# Patient Record
Sex: MALE | Race: WHITE | NOT HISPANIC OR LATINO | Employment: STUDENT | URBAN - METROPOLITAN AREA
[De-identification: names, ages, dates, MRNs, and addresses within clinical notes are randomized per-mention and may not be internally consistent; named-entity substitution may affect disease eponyms.]

---
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
## 2022-05-03 ENCOUNTER — SOCIAL WORK (OUTPATIENT)
Dept: BEHAVIORAL/MENTAL HEALTH CLINIC | Facility: CLINIC | Age: 22
End: 2022-05-03
Payer: COMMERCIAL

## 2022-05-03 DIAGNOSIS — F41.9 ANXIETY: Primary | ICD-10-CM

## 2022-05-03 PROCEDURE — 90834 PSYTX W PT 45 MINUTES: CPT | Performed by: SOCIAL WORKER

## 2022-05-04 NOTE — PSYCH
Psychotherapy Provided: Individual Psychotherapy 45 minutes     Length of time in session: 45 minutes, follow up in 2 weeks  Goals addressed in session: Goal 1     Pt presents to this writer with recent onset of anxiety  Pt reports the problem started to manifest about 18 months ago, and came out of nowhere  Pt reports he is currently a student at [employer]  Pt reports his course is going well  He is studying management, and supply chain  Pt reports when he is not at school he lives with his parents and younger sister  Pt reports his family are high functioning, professional, and very supportive  Pt reports he and his sister have always been pushed by their parents  Pt states he also pushes himself and wants to excel in school and in his personal life  Pt reports he has no major trauma hx  Pt enjoyed highschool and academically pt was not all that challenged  Pt reports his Melvi Harder has been a step up, and pt reports he found the transition a little difficult to handle at first  Pt reports he plays sports  Pt has a girlfriend  Pt has a good friendship group  Pt reports he feels pretty good about himself  Pt noticed the anxiety elevated around the latter stages of Covid  Pt reports he thinks this was because all the norms of life went out of the window  Pt also states his sister also started to experience elevated anxiety around the same time  Pt reports he feels this indicates he and his sister have similar constitutions and there was something about the external stressors that led to them both feeling an increase in anxiety  Pt reports he tends to get spikes of anxiety  Pt reports most of the spikes in anxiety are at times where he feels added pressure, or when he is entering a situation where he has less control, or the situation is unfamiliar, with a certain expectation placed upon him   Pt reports he has experienced anxiety at certain family reunions, going to dance recitals with his girlfriend  Pt reports the occasions are quite sparse, but when he has an anxiety outbreak he starts to get worried about the anxiety and he is concerned it will snow ball  Pt states when he gets the elevated anxiety, he is already working on grounding techniques and self soothing techniques  Pt reports he has some success with these techniques, but they are not sufficient enough to stop a fully developed anxiety attack from discontinuing  This writer and pt discussed strategies to increase pt's chances of success  Pt is encouraged to try and limit caffeine, improve his exercise, utilize structure and routine to make his life more predictable  Pt is encouraged to always make decisions based on having a little more control  For example, taking his car and not his girlfriends  Pt is encouraged to address some of the general anxiety with an increase in self care, and an increase in structure and routine in his life  Pt and this writer also discussed as needed medicine  Pt reports for the amount of times he gets an anxiety attack, it might be useful to have a medicine like Atarax for those situations which pt knows ahead of time will be challenging  This writer and pt even discussed how having PRN medication on hand could be away to help his mind calm down, and feel less worried about an anxiety outbreak, which can be a positive self fulfilling prophecy instead of a negative one  Pt is encouraged to follow up with this writer as needed  Pain:      none    0    Current suicide risk : Low     Pt is not suicidal and future oriented  Behavioral Health Treatment Plan [facility]: Diagnosis and Treatment Plan explained to Maude Briones relates understanding diagnosis and is agreeable to Treatment Plan   Yes

## 2022-05-10 ENCOUNTER — OFFICE VISIT (OUTPATIENT)
Dept: FAMILY MEDICINE CLINIC | Facility: OTHER | Age: 22
End: 2022-05-10
Payer: COMMERCIAL

## 2022-05-10 ENCOUNTER — TELEPHONE (OUTPATIENT)
Dept: FAMILY MEDICINE CLINIC | Facility: OTHER | Age: 22
End: 2022-05-10

## 2022-05-10 VITALS
TEMPERATURE: 97.8 F | DIASTOLIC BLOOD PRESSURE: 86 MMHG | WEIGHT: 134.2 LBS | OXYGEN SATURATION: 98 % | HEART RATE: 93 BPM | BODY MASS INDEX: 17.79 KG/M2 | SYSTOLIC BLOOD PRESSURE: 134 MMHG | HEIGHT: 73 IN | RESPIRATION RATE: 18 BRPM

## 2022-05-10 DIAGNOSIS — Z11.59 NEED FOR HEPATITIS C SCREENING TEST: ICD-10-CM

## 2022-05-10 DIAGNOSIS — F40.10 SOCIAL ANXIETY DISORDER: ICD-10-CM

## 2022-05-10 DIAGNOSIS — Z13.1 SCREENING FOR DIABETES MELLITUS: ICD-10-CM

## 2022-05-10 DIAGNOSIS — Z00.00 ANNUAL PHYSICAL EXAM: Primary | ICD-10-CM

## 2022-05-10 DIAGNOSIS — Z13.6 SCREENING FOR CARDIOVASCULAR CONDITION: ICD-10-CM

## 2022-05-10 DIAGNOSIS — Z11.4 SCREENING FOR HIV (HUMAN IMMUNODEFICIENCY VIRUS): ICD-10-CM

## 2022-05-10 PROBLEM — J30.1 SEASONAL ALLERGIC RHINITIS DUE TO POLLEN: Status: ACTIVE | Noted: 2017-08-16

## 2022-05-10 PROBLEM — J45.990 EXERCISE-INDUCED BRONCHOSPASM: Status: ACTIVE | Noted: 2018-08-27

## 2022-05-10 PROCEDURE — 1036F TOBACCO NON-USER: CPT | Performed by: FAMILY MEDICINE

## 2022-05-10 PROCEDURE — 3008F BODY MASS INDEX DOCD: CPT | Performed by: FAMILY MEDICINE

## 2022-05-10 PROCEDURE — 99385 PREV VISIT NEW AGE 18-39: CPT | Performed by: FAMILY MEDICINE

## 2022-05-10 PROCEDURE — 3725F SCREEN DEPRESSION PERFORMED: CPT | Performed by: FAMILY MEDICINE

## 2022-05-10 RX ORDER — LORAZEPAM 0.5 MG/1
0.5 TABLET ORAL DAILY PRN
Qty: 10 TABLET | Refills: 0 | Status: SHIPPED | OUTPATIENT
Start: 2022-05-10

## 2022-05-10 RX ORDER — LORATADINE 10 MG/1
10 TABLET ORAL DAILY
COMMUNITY

## 2022-05-10 NOTE — PROGRESS NOTES
Nirav PEREZ    NAME: Matilde Ronquillo  AGE: 22 y o  SEX: male  : 2000     DATE: 5/10/2022     Assessment and Plan:     Problem List Items Addressed This Visit        Other    Social anxiety disorder     · With component of performance anxiety   · Continue with CBT therapy  · Continue with pre-event meditation and breathing exercises  · Check TSH  · Trial prn ativan 0 5mg to use only if above measures fail  · If patient does not tolerate ativan, can trial propanolol as well, ritu if physical symptoms develop with events including palpitations, SOB  · Follow up in 1 year at annual physical or sooner if needed         Relevant Medications    LORazepam (Ativan) 0 5 mg tablet    Other Relevant Orders    TSH, 3rd generation with Free T4 reflex      Other Visit Diagnoses     Annual physical exam    -  Primary    Screening for cardiovascular condition        Relevant Orders    Lipid panel    Screening for diabetes mellitus        Relevant Orders    Comprehensive metabolic panel    Screening for HIV (human immunodeficiency virus)        Relevant Orders    HIV 1/2 Antigen/Antibody (4th Generation) w Reflex [facility]    Need for hepatitis C screening test        Relevant Orders    Hepatitis C antibody          Immunizations and preventive care screenings were discussed with patient today  Appropriate education was printed on patient's after visit summary  Counseling:  Alcohol/drug use: discussed moderation in alcohol intake, the recommendations for healthy alcohol use, and avoidance of illicit drug use  Dental Health: discussed importance of regular tooth brushing, flossing, and dental visits  Injury prevention: discussed safety/seat belts, safety helmets, smoke detectors, carbon dioxide detectors, and smoking near bedding or upholstery    Sexual health: discussed sexually transmitted diseases, partner selection, use of condoms, avoidance of unintended pregnancy, and contraceptive alternatives  · Exercise: the importance of regular exercise/physical activity was discussed  Recommend exercise 3-5 times per week for at least 30 minutes  BMI Counseling: Body mass index is 25 36 kg/m²  The BMI is below normal  Patient advised to gain weight  Rationale for BMI follow-up plan is due to patient being underweight  Depression Screening and Follow-up Plan: Patient was screened for depression during today's encounter  They screened negative with a PHQ-2 score of 0  Nutrition Assessment and Intervention:     Nutrition patient handout reviewed with patient      Other interventions: Current diet regimen reviewed with patient, and recommendations based on current guidelines were discussed  Handout was provided  Physical Activity Assessment and Intervention:    Physical Activity patient handout reviewed with patient      Other interventions: Current exercise regimen reviewed with patient, and recommendations based on current guidelines were discussed  Handout was provided  Emotional and Mental Well-being, Sleep, Connectedness Assessment and Intervention:    Sleep/stress assessment performed    Depression and anxiety screening performed and reviewed      Tobacco and Toxic Substance Assessment and Intervention:     Tobacco use screening performed    Alcohol and drug use screening performed        No follow-ups on file  Chief Complaint:     Chief Complaint   Patient presents with    SLP Initial Evaluation    Annual Exam      History of Present Illness:     Adult Annual Physical   Patient here for a comprehensive physical exam  The patient reports history of anxiety, follows with Jordan Ferris in our office for therapy, just initiated last week  Reports issues with anxiety over the past year  No known changes in his life leading to this  Feels anxious usually before events, usually about once a month   He feels this has started to improve and he has techniques he uses such as deep breathing exercises to prepare for events  Events include "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, meeting his gf's parents, ie  important social events where he "feels like he is being judged " He reports Tahmina Sandoval told him this was a form of social anxiety, and recommended a prn medication  Denies SOB or palpitations  He was previously seen by pediatrics for annual wellness care  He is here to establish care with us  Diet and Physical Activity  · Diet/Nutrition: well balanced diet, limited junk food, low fat diet, low carb diet and consuming 3-5 servings of fruits/vegetables daily  · Exercise: moderate cardiovascular exercise, vigorous cardiovascular exercise and runs every other day  Depression Screening  PHQ-2/9 Depression Screening    Little interest or pleasure in doing things: 0 - not at all  Feeling down, depressed, or hopeless: 0 - not at all  PHQ-2 Score: 0  PHQ-2 Interpretation: Negative depression screen         SUE-7 Flowsheet Screening      Most Recent Value   Over the last 2 weeks, how often have you been bothered by any of the following problems? Feeling nervous, anxious, or on edge 0   Not being able to stop or control worrying 1   Worrying too much about different things 0   Trouble relaxing 0   Being so restless that it is hard to sit still 0   Becoming easily annoyed or irritable 0   Feeling afraid as if something awful might happen 0   SUE-7 Total Score 1          General Health  · Sleep: sleeps well and gets 7-8 hours of sleep on average  · Hearing: normal - bilateral   · Vision: no vision problems  · Dental: regular dental visits and brushes teeth twice daily   Health   Sexually active with one female partner, uses condoms  · History of STDs?: no      Review of Systems:     Review of Systems   Constitutional: Negative for activity change, appetite change, chills, diaphoresis, fatigue, fever and unexpected weight change  HENT: Positive for rhinorrhea  Negative for congestion, sore throat and trouble swallowing  Eyes: Positive for itching  Negative for visual disturbance  Respiratory: Negative for cough, choking and shortness of breath  Cardiovascular: Negative for chest pain, palpitations and leg swelling  Gastrointestinal: Negative for abdominal pain, constipation, diarrhea, nausea and vomiting  Genitourinary: Negative for difficulty urinating, dysuria and frequency  Musculoskeletal: Negative for arthralgias, joint swelling and myalgias  Skin: Negative for color change and rash  Allergic/Immunologic: Positive for environmental allergies  Negative for food allergies  Neurological: Negative for dizziness, light-headedness and headaches  Psychiatric/Behavioral: Negative for dysphoric mood  The patient is not nervous/anxious  Past Medical History:     Past Medical History:   Diagnosis Date    Asthma     Exercise-induced asthma       Past Surgical History:     Past Surgical History:   Procedure Laterality Date    WISDOM TOOTH EXTRACTION  2016      Social History:     Social History     Socioeconomic History    Marital status: Single     Spouse name: None    Number of children: None    Years of education: None    Highest education level: None   Occupational History    Occupation:      Comment: [employer]    Occupation: student     Comment: [address] [place] [address], [facility]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   Tobacco Use    Smoking status: Never Smoker    Smokeless tobacco: Never Used   Vaping Use    Vaping Use: Never used   Substance and Sexual Activity    Alcohol use:  Yes     Alcohol/week: 4 0 standard drinks     Types: 4 Standard drinks or equivalent per week     Comment: social    Drug use: Never    Sexual activity: Yes     Partners: Female     Birth control/protection: Condom Male   Other Topics Concern    None   Social History Narrative    None     Social Determinants of Health     Financial Resource Strain: Not on file   Food Insecurity: Not on file   Transportation Needs: Not on file   Physical Activity: Not on file   Stress: Not on file   Social Connections: Not on file   Intimate Partner Violence: Not on file   Housing Stability: Not on file      Family History:     Family History   Problem Relation Age of Onset    Mental illness Mother     Anxiety disorder Mother     Mental illness Sister       Current Medications:     Current Outpatient Medications   Medication Sig Dispense Refill    loratadine (CLARITIN) 10 mg tablet Take 10 mg by mouth daily      LORazepam (Ativan) 0 5 mg tablet Take 1 tablet (0 5 mg total) by mouth daily as needed for anxiety 10 tablet 0     No current facility-administered medications for this visit  Allergies:     No Known Allergies      Physical Exam:     /86   Pulse 93   Temp 97 8 °F (36 6 °C)   Resp 18   Ht 6' 1" (1 854 m)   Wt 60 9 kg (134 lb 3 2 oz)   SpO2 98%   BMI 17 71 kg/m²     Physical Exam  Vitals reviewed  Constitutional:       General: He is not in acute distress  Appearance: Normal appearance  He is normal weight  He is not ill-appearing  HENT:      Head: Normocephalic and atraumatic  Right Ear: Tympanic membrane, ear canal and external ear normal       Left Ear: Tympanic membrane, ear canal and external ear normal       Nose: Nose normal  No congestion  Mouth/Throat:      Mouth: Mucous membranes are moist       Pharynx: Oropharynx is clear  Eyes:      Extraocular Movements: Extraocular movements intact  Conjunctiva/sclera: Conjunctivae normal       Pupils: Pupils are equal, round, and reactive to light  Cardiovascular:      Rate and Rhythm: Normal rate and regular rhythm  Pulses: Normal pulses  Heart sounds: Normal heart sounds  Pulmonary:      Effort: Pulmonary effort is normal       Breath sounds: Normal breath sounds  Abdominal:      General: Abdomen is flat   Bowel sounds are normal       Palpations: Abdomen is soft  Musculoskeletal:      Cervical back: Normal range of motion and neck supple  Right lower leg: No edema  Left lower leg: No edema  Skin:     General: Skin is warm and dry  Neurological:      General: No focal deficit present  Mental Status: He is alert and oriented to person, place, and time  Cranial Nerves: No cranial nerve deficit  Sensory: No sensory deficit  Motor: No weakness  Gait: Gait normal    Psychiatric:         Mood and Affect: Mood normal          Behavior: Behavior normal          Thought Content:  Thought content normal           Collette Stamp, DO   [facility] [address] [id number],[facility]

## 2022-05-10 NOTE — ASSESSMENT & PLAN NOTE
· With component of performance anxiety   · Continue with CBT therapy  · Continue with pre-event meditation and breathing exercises  · Check TSH  · Trial prn ativan 0 5mg to use only if above measures fail  · If patient does not tolerate ativan, can trial propanolol as well, ritu if physical symptoms develop with events including palpitations, SOB    · Follow up in 1 year at annual physical or sooner if needed

## 2022-05-10 NOTE — PATIENT INSTRUCTIONS
Wellness Visit for Adults   AMBULATORY CARE:   A wellness visit  is when you see your healthcare provider to get screened for health problems  Your healthcare provider will also give you advice on how to stay healthy  Write down your questions so you remember to ask them  Ask your healthcare provider how often you should have a wellness visit  What happens at a wellness visit:  Your healthcare provider will ask about your health, and your family history of health problems  This includes high blood pressure, heart disease, and cancer  He or she will ask if you have symptoms that concern you, if you smoke, and about your mood  You may also be asked about your intake of medicines, supplements, food, and alcohol  Any of the following may be done:  · Your weight  will be checked  Your height may also be checked so your body mass index (BMI) can be calculated  Your BMI shows if you are at a healthy weight  · Your blood pressure  and heart rate will be checked  Your temperature may also be checked  · Blood and urine tests  may be done  Blood tests may be done to check your cholesterol levels  Abnormal cholesterol levels increase your risk for heart disease and stroke  You may also need a blood or urine test to check for diabetes if you are at increased risk  Urine tests may be done to look for signs of an infection or kidney disease  · A physical exam  includes checking your heartbeat and lungs with a stethoscope  Your healthcare provider may also check your skin to look for sun damage  · Screening tests  may be recommended  A screening test is done to check for diseases that may not cause symptoms  The screening tests you may need depend on your age, gender, family history, and lifestyle habits  For example, colorectal screening may be recommended if you are 48years old or older  Screening tests you need if you are a woman:   · A Pap smear  is used to screen for cervical cancer   Pap smears are usually done every 3 to 5 years depending on your age  You may need them more often if you have had abnormal Pap smear test results in the past  Ask your healthcare provider how often you should have a Pap smear  · A mammogram  is an x-ray of your breasts to screen for breast cancer  Experts recommend mammograms every 2 years starting at age 48 years  You may need a mammogram at age 52 years or younger if you have an increased risk for breast cancer  Talk to your healthcare provider about when you should start having mammograms and how often you need them  Vaccines you may need:   · Get an influenza vaccine  every year  The influenza vaccine protects you from the flu  Several types of viruses cause the flu  The viruses change over time, so new vaccines are made each year  · Get a tetanus-diphtheria (Td) booster vaccine  every 10 years  This vaccine protects you against tetanus and diphtheria  Tetanus is a severe infection that may cause painful muscle spasms and lockjaw  Diphtheria is a severe bacterial infection that causes a thick covering in the back of your mouth and throat  · Get a human papillomavirus (HPV) vaccine  if you are female and aged 23 to 32 or male 23 to 24 and never received it  This vaccine protects you from HPV infection  HPV is the most common infection spread by sexual contact  HPV may also cause vaginal, penile, and anal cancers  · Get a pneumococcal vaccine  if you are aged 72 years or older  The pneumococcal vaccine is an injection given to protect you from pneumococcal disease  Pneumococcal disease is an infection caused by pneumococcal bacteria  The infection may cause pneumonia, meningitis, or an ear infection  · Get a shingles vaccine  if you are 60 or older, even if you have had shingles before  The shingles vaccine is an injection to protect you from the varicella-zoster virus  This is the same virus that causes chickenpox   Shingles is a painful rash that develops in people who had chickenpox or have been exposed to the virus  How to eat healthy:  My Plate is a model for planning healthy meals  It shows the types and amounts of foods that should go on your plate  Fruits and vegetables make up about half of your plate, and grains and protein make up the other half  A serving of dairy is included on the side of your plate  The amount of calories and serving sizes you need depends on your age, gender, weight, and height  Examples of healthy foods are listed below:  · Eat a variety of vegetables  such as dark green, red, and orange vegetables  You can also include canned vegetables low in sodium (salt) and frozen vegetables without added butter or sauces  · Eat a variety of fresh fruits , canned fruit in 100% juice, frozen fruit, and dried fruit  · Include whole grains  At least half of the grains you eat should be whole grains  Examples include whole-wheat bread, wheat pasta, brown rice, and whole-grain cereals such as oatmeal     · Eat a variety of protein foods such as seafood (fish and shellfish), lean meat, and poultry without skin (turkey and chicken)  Examples of lean meats include pork leg, shoulder, or tenderloin, and beef round, sirloin, tenderloin, and extra lean ground beef  Other protein foods include eggs and egg substitutes, beans, peas, soy products, nuts, and seeds  · Choose low-fat dairy products such as skim or 1% milk or low-fat yogurt, cheese, and cottage cheese  · Limit unhealthy fats  such as butter, hard margarine, and shortening  Exercise:  Exercise at least 30 minutes per day on most days of the week  Some examples of exercise include walking, biking, dancing, and swimming  You can also fit in more physical activity by taking the stairs instead of the elevator or parking farther away from stores  Include muscle strengthening activities 2 days each week  Regular exercise provides many health benefits   It helps you manage your weight, and decreases your risk for type 2 diabetes, heart disease, stroke, and high blood pressure  Exercise can also help improve your mood  Ask your healthcare provider about the best exercise plan for you  General health and safety guidelines:   · Do not smoke  Nicotine and other chemicals in cigarettes and cigars can cause lung damage  Ask your healthcare provider for information if you currently smoke and need help to quit  E-cigarettes or smokeless tobacco still contain nicotine  Talk to your healthcare provider before you use these products  · Limit alcohol  A drink of alcohol is 12 ounces of beer, 5 ounces of wine, or 1½ ounces of liquor  · Lose weight, if needed  Being overweight increases your risk of certain health conditions  These include heart disease, high blood pressure, type 2 diabetes, and certain types of cancer  · Protect your skin  Do not sunbathe or use tanning beds  Use sunscreen with a SPF 15 or higher  Apply sunscreen at least 15 minutes before you go outside  Reapply sunscreen every 2 hours  Wear protective clothing, hats, and sunglasses when you are outside  · Drive safely  Always wear your seatbelt  Make sure everyone in your car wears a seatbelt  A seatbelt can save your life if you are in an accident  Do not use your cell phone when you are driving  This could distract you and cause an accident  Pull over if you need to make a call or send a text message  · Practice safe sex  Use latex condoms if are sexually active and have more than one partner  Your healthcare provider may recommend screening tests for sexually transmitted infections (STIs)  · Wear helmets, lifejackets, and protective gear  Always wear a helmet when you ride a bike or motorcycle, go skiing, or play sports that could cause a head injury  Wear protective equipment when you play sports  Wear a lifejacket when you are on a boat or doing water sports      © Copyright [employer] 2022 Information is for End User's use only and may not be sold, redistributed or otherwise used for commercial purposes  All illustrations and images included in CareNotes® are the copyrighted property of [employer] A M [employer] Inc  or Ronda Olivia  The above information is an  only  It is not intended as medical advice for individual conditions or treatments  Talk to your doctor, nurse or pharmacist before following any medical regimen to see if it is safe and effective for you  Social Anxiety Disorder   WHAT YOU NEED TO KNOW:   Social anxiety disorder causes you to worry or be afraid in all or most social situations  Examples include meeting new people, going on a date, or speaking and performing in front of people  Social anxiety disorder is also called social phobia  Social anxiety disorder can cause problems with work, school, or other daily activities  DISCHARGE INSTRUCTIONS:   Call 911 for any of the following:   · You have chest pain, tightness, or heaviness that may spread to your shoulders, arms, jaw, neck, or back  · You feel like hurting yourself or someone else  Contact your healthcare provider if:   · Your symptoms get worse or do not get better with treatment  · You have new symptoms since your last visit  · You have questions or concerns about your condition or care  Medicines:   · Medicines  help you feel more calm and relaxed, and decrease your symptoms  · Take your medicine as directed  Contact your healthcare provider if you think your medicine is not helping or if you have side effects  Tell him of her if you are allergic to any medicine  Keep a list of the medicines, vitamins, and herbs you take  Include the amounts, and when and why you take them  Bring the list or the pill bottles to follow-up visits  Carry your medicine list with you in case of an emergency  Manage anxiety:   · Talk to someone about your anxiety  Your healthcare provider may suggest counseling   Cognitive behavioral therapy can help you understand and change how you react to events  It can also help you understand what triggers your symptoms  You might feel more comfortable talking with a friend or family member about your anxiety  Choose someone you know will be supportive and encouraging  You can also join a support group  A support group lets you talk with others who have social anxiety disorder  · Find ways to relax  Activities such as yoga, meditation, or listening to music can help you relax  It may help to do these activities before a social event or speech  · Practice deep breathing  Deep breathing can help you relax when you feel anxious  Focus on taking slow, deep breaths several times a day, or before a social situation  Slowly breathe in through your nose  Pause, then slowly breathe out through your mouth  Try to breathe out longer than you breathed in  · Create a regular sleep routine  Regular sleep can help you feel calmer during the day  Go to sleep and wake up at the same times every day  Do not watch television or use the computer right before bed  Your room should be comfortable, dark, and quiet  · Eat a variety of healthy foods  Healthy foods include fruits, vegetables, low-fat dairy products, lean meats, fish, whole-grain breads, and cooked beans  Healthy foods can help you feel less anxious and have more energy  · Exercise regularly  Exercise can increase your energy level  Exercise may also lift your mood and help you sleep better  Your healthcare provider can help you create an exercise plan  · Do not smoke  Nicotine and other chemicals in cigarettes and cigars can increase anxiety  Ask your healthcare provider for information if you currently smoke and need help to quit  E-cigarettes or smokeless tobacco still contain nicotine  Talk to your healthcare provider before you use these products  · Do not have caffeine  Caffeine can make your symptoms worse   Do not have foods or drinks that are meant to increase your energy level  · Limit or do not drink alcohol  Ask your healthcare provider if alcohol is safe for you  Also ask how much is safe  You may not be able to drink alcohol if you take certain anxiety or depression medicines  · Do not use drugs  Drugs can make your anxiety worse  It can also make anxiety hard to manage  Talk to your healthcare provider if you use drugs and want help to quit  Follow up with your doctor as directed:  Write down your questions so you remember to ask them during your visits  © Copyright [employer] 2022 Information is for End User's use only and may not be sold, redistributed or otherwise used for commercial purposes  All illustrations and images included in CareNotes® are the copyrighted property of [employer]  or [facility] Neelima Valero   The above information is an  only  It is not intended as medical advice for individual conditions or treatments  Talk to your doctor, nurse or pharmacist before following any medical regimen to see if it is safe and effective for you

## 2022-06-21 ENCOUNTER — SOCIAL WORK (OUTPATIENT)
Dept: BEHAVIORAL/MENTAL HEALTH CLINIC | Facility: CLINIC | Age: 22
End: 2022-06-21
Payer: COMMERCIAL

## 2022-06-21 DIAGNOSIS — F41.9 ANXIETY: Primary | ICD-10-CM

## 2022-06-21 PROCEDURE — 90834 PSYTX W PT 45 MINUTES: CPT | Performed by: SOCIAL WORKER

## 2022-06-26 NOTE — PSYCH
1  Anxiety       Data:   Pt presents to this writer with ongoing SUE  Pt reports he is doing better since last meeting  Pt reports he also feels like during summer break he is generally less anxious, with less reasons to be worried and fearful  Pt reports he enjoys his job as a , and he makes good money  Pt states the only time where he started to feel a heightened sense of anxiety was when he was going away with his girlfriends family  Pt reports he did what was discussed last session, and drove his own car to the vacation house  Pt felt this was very helpful, as he was more in control, which also provided him with this option to leave on his terms and not get stuck  Pt also reports he went to see his PCP and he has a very minimal prescition of Xanax for emergencies  Pt reports he has not had to use them, but he feels just having this prescription is helpful and puts his mind and emotions as need knowing he has something he can take PRN  Assessment : This wrier and pt reviewed last session, and reviewed importance of pt having structure, regular exercise, and taking every opportunity to be in control of variable where possible  This writer and pt also discussed importance of having a buffer between pt's self care and the point where he starts to become very anxious and disrupted  Pt is encouraged to think about ways he can reduce general anxiety such as getting assignments done early, going to the class buildings before hand, leaving early to go to work etc  Pt has also been working on his thinking to improve the thoughts and to shut down any catastrophic thought processes  Plan: Pt is encouraged to continue to work on healthy thinking, as well as increasing the buffer between pt and the place where pt has panic attacks, particularly self care, organization and healthy thinking  Pt is encouraged to follow up with this writer after first week of new school semester to see how pt is trending  Psychotherapy Provided: Individual Psychotherapy 45 minutes     Length of time in session: 45 minutes, follow up in 2 months time  Goals addressed in session: Goal 1 Reduce anxiety symptoms  Pain:      none    0    Current suicide risk : Low     Pt is future oriented and not suicidal      Behavioral Health Treatment Plan [facility]: Diagnosis and Treatment Plan explained to Jay Ellison relates understanding diagnosis and is agreeable to Treatment Plan   Yes

## 2023-01-12 ENCOUNTER — TELEMEDICINE (OUTPATIENT)
Dept: FAMILY MEDICINE CLINIC | Facility: OTHER | Age: 23
End: 2023-01-12

## 2023-01-12 DIAGNOSIS — F40.10 SOCIAL ANXIETY DISORDER: ICD-10-CM

## 2023-01-12 DIAGNOSIS — F41.1 GENERALIZED ANXIETY DISORDER: Primary | ICD-10-CM

## 2023-01-12 NOTE — PROGRESS NOTES
Name: Tone Plascencia      : 2000      MRN: [id number]  Encounter Provider: Estefani Kaye DO  Encounter Date: 2023   Encounter department: [address] [place] [address] consent    Patient: Tone Plascencia  Provider: Estefani Kaye DO  Provider located at [address] [address]    The patient was identified by name and date of birth  Tone Plascencia was informed that this is a telemedicine visit and that the visit is being conducted through the [employer]  He agrees to proceed     My office door was closed  No one else was in the room  He acknowledged consent and understanding of privacy and security of the video platform  The patient has agreed to participate and understands they can discontinue the visit at any time  Patient is aware this is a billable service  I spent 15 minutes with the patient during this visit  Assessment & Plan     1  Generalized anxiety disorder  Assessment & Plan:  · GAD7 score 1 > 11  · Now with symptoms concerning for generalized anxiety  · Continue with CBT and regular meditation/relaxation   · Will initiate treatment with SSRI - Zoloft 25mg daily - side effects discussed with patient and handout on medication was provided in AVS  · Follow up in 6 weeks    Orders:  -     sertraline (Zoloft) 25 mg tablet; Take 1 tablet (25 mg total) by mouth daily    2  Social anxiety disorder  Assessment & Plan:  · With component of performance anxiety   · Continue with CBT therapy  · Continue with pre-event meditation and breathing exercises  · Check TSH - previously ordered  · Continue prn ativan 0 5mg to use only if above measures fail  · Follow up in 6 weeks    Orders:  -     sertraline (Zoloft) 25 mg tablet;  Take 1 tablet (25 mg total) by mouth daily         Subjective    CC: social anxiety f/u  HPI   He reports his anxiety continues to manifest itself with feelings of acute nausea, near vomiting, feeling likes he may pass out  He has had a few times where he actually vomited  This usually occurs before social events  He has done therapy for this in the past  He has used ativan prn in the past before events that he suspects will cause his attacks  He has used this medcaiton 3 times in the past year and does feel it helped  He used it before a presentation and before a job interview for example  Overall the social anxiety used to be the only concern, however in recent months, while in [facility], the symptoms of anxiety and worry are occuring more on a daily basis  He has regular club meetings and is able to go but just has an ongoing thought and feelings of anxiety and worry  He reports his mother also struggles with anxiety and takes medication for it  SUE-7 Flowsheet Screening    Flowsheet Row Most Recent Value   Over the last 2 weeks, how often have you been bothered by any of the following problems? Feeling nervous, anxious, or on edge 2   Not being able to stop or control worrying 3   Worrying too much about different things 3   Trouble relaxing 1   Being so restless that it is hard to sit still 0   Becoming easily annoyed or irritable 0   Feeling afraid as if something awful might happen 2   SUE-7 Total Score 11            Review of Systems   Respiratory: Negative for shortness of breath  Cardiovascular: Negative for palpitations  Gastrointestinal: Positive for nausea  Psychiatric/Behavioral: Negative for agitation and dysphoric mood  The patient is nervous/anxious  The patient is not hyperactive  Current Outpatient Medications on File Prior to Visit   Medication Sig   • loratadine (CLARITIN) 10 mg tablet Take 10 mg by mouth daily   • LORazepam (Ativan) 0 5 mg tablet Take 1 tablet (0 5 mg total) by mouth daily as needed for anxiety       Objective     There were no vitals taken for this visit      Physical Exam  Constitutional:       General: He is not in acute distress  Appearance: He is normal weight  He is not ill-appearing  HENT:      Head: Normocephalic and atraumatic  Right Ear: External ear normal       Left Ear: External ear normal       Nose: Nose normal    Eyes:      Extraocular Movements: Extraocular movements intact  Conjunctiva/sclera: Conjunctivae normal    Pulmonary:      Effort: Pulmonary effort is normal    Neurological:      Mental Status: He is alert  Psychiatric:         Attention and Perception: Attention normal          Mood and Affect: Mood and affect normal          Speech: Speech normal          Behavior: Behavior normal  Behavior is cooperative  Thought Content:  Thought content normal             Sara Chavez, DO

## 2023-01-16 PROBLEM — F41.1 GENERALIZED ANXIETY DISORDER: Status: ACTIVE | Noted: 2023-01-16

## 2023-01-16 RX ORDER — SERTRALINE HYDROCHLORIDE 25 MG/1
25 TABLET, FILM COATED ORAL DAILY
Qty: 60 TABLET | Refills: 0 | Status: SHIPPED | OUTPATIENT
Start: 2023-01-16

## 2023-01-16 NOTE — ASSESSMENT & PLAN NOTE
· GAD7 score 1 > 11  · Now with symptoms concerning for generalized anxiety  · Continue with CBT and regular meditation/relaxation   · Will initiate treatment with SSRI - Zoloft 25mg daily - side effects discussed with patient and handout on medication was provided in AVS  · Follow up in 6 weeks

## 2023-01-16 NOTE — ASSESSMENT & PLAN NOTE
· With component of performance anxiety   · Continue with CBT therapy  · Continue with pre-event meditation and breathing exercises  · Check TSH - previously ordered  · Continue prn ativan 0 5mg to use only if above measures fail  · Follow up in 6 weeks

## 2023-04-07 ENCOUNTER — TELEMEDICINE (OUTPATIENT)
Dept: FAMILY MEDICINE CLINIC | Facility: OTHER | Age: 23
End: 2023-04-07

## 2023-04-07 DIAGNOSIS — F40.10 SOCIAL ANXIETY DISORDER: ICD-10-CM

## 2023-04-07 DIAGNOSIS — F41.1 GENERALIZED ANXIETY DISORDER: Primary | ICD-10-CM

## 2023-04-07 NOTE — ASSESSMENT & PLAN NOTE
· GAD7 score 11>5  · Continue with CBT and regular meditation/relaxation   · Increase Zoloft to 50mg daily  · Follow up in 8 weeks at [facility]

## 2023-04-07 NOTE — ASSESSMENT & PLAN NOTE
· With component of performance anxiety   · Continue with CBT therapy  · Continue with pre-event meditation and breathing exercises  · Continue prn ativan 0 5mg to use only if above measures fail  · Follow up in 8 weeks at [facility]

## 2023-04-07 NOTE — PROGRESS NOTES
Name: Iman Yung      : 2000      MRN: [id number]  Encounter Provider: Russel Zhao DO  Encounter Date: 2023   Encounter department: [address] [place] [address] consent    Patient: Iman Yung  Provider: Russel Zhao DO  Provider located at [address] [address]    The patient was identified by name and date of birth  Iman Yung was informed that this is a telemedicine visit and that the visit is being conducted through the [employer]  He agrees to proceed     My office door was closed  No one else was in the room  He acknowledged consent and understanding of privacy and security of the video platform  The patient has agreed to participate and understands they can discontinue the visit at any time  Patient is aware this is a billable service  I spent 15 minutes with the patient during this visit  Assessment & Plan     1  Generalized anxiety disorder  Assessment & Plan:  · GAD7 score 11>5  · Continue with CBT and regular meditation/relaxation   · Increase Zoloft to 50mg daily  · Follow up in 8 weeks at [facility]    Orders:  -     sertraline (Zoloft) 50 mg tablet; Take 1 tablet (50 mg total) by mouth daily    2  Social anxiety disorder  Assessment & Plan:  · With component of performance anxiety   · Continue with CBT therapy  · Continue with pre-event meditation and breathing exercises  · Continue prn ativan 0 5mg to use only if above measures fail  · Follow up in 8 weeks at [facility]    Orders:  -     sertraline (Zoloft) 50 mg tablet; Take 1 tablet (50 mg total) by mouth daily         Subjective      HPI   Started Zoloft 30 days ago  Has been tolerating it well  He did have some headache the first few days, decreased appetite as well but these symptoms went away  He feels there has been a little improvement in his anxiety but still with daily thoughts of nervousness   He does notice he is able to control other acute situations a little better than before  He has still had 3 anxiety attacks in the past 30 days but not o the poitn of needing ativan  Has not been able to establish with therapy again in our office  Interested in seeing Ann Marie Began again  Overall school is going well and he feels prepared for upcoming final exam      SUE-7 Flowsheet Screening    Flowsheet Row Most Recent Value   Over the last 2 weeks, how often have you been bothered by any of the following problems? Feeling nervous, anxious, or on edge 2   Not being able to stop or control worrying 1   Worrying too much about different things 0   Trouble relaxing 0   Being so restless that it is hard to sit still 0   Becoming easily annoyed or irritable 0   Feeling afraid as if something awful might happen 2   SUE-7 Total Score 5            Review of Systems   Respiratory: Negative for shortness of breath  Cardiovascular: Negative for palpitations  Gastrointestinal: Positive for nausea  Negative for vomiting  Neurological: Positive for headaches  Psychiatric/Behavioral: Negative for agitation and dysphoric mood  The patient is nervous/anxious  The patient is not hyperactive  Current Outpatient Medications on File Prior to Visit   Medication Sig   • loratadine (CLARITIN) 10 mg tablet Take 10 mg by mouth daily   • LORazepam (Ativan) 0 5 mg tablet Take 1 tablet (0 5 mg total) by mouth daily as needed for anxiety   • [DISCONTINUED] sertraline (Zoloft) 25 mg tablet Take 1 tablet (25 mg total) by mouth daily       Objective     There were no vitals taken for this visit  Physical Exam  Constitutional:       General: He is not in acute distress  Appearance: He is normal weight  He is not ill-appearing  HENT:      Head: Normocephalic and atraumatic  Right Ear: External ear normal       Left Ear: External ear normal       Nose: Nose normal    Eyes:      Extraocular Movements: Extraocular movements intact  Conjunctiva/sclera: Conjunctivae normal    Pulmonary:      Effort: Pulmonary effort is normal    Neurological:      Mental Status: He is alert  Psychiatric:         Attention and Perception: Attention normal          Mood and Affect: Mood and affect normal          Speech: Speech normal          Behavior: Behavior normal  Behavior is cooperative  Thought Content:  Thought content normal        Mary Meadows, DO

## 2023-06-05 ENCOUNTER — OFFICE VISIT (OUTPATIENT)
Dept: FAMILY MEDICINE CLINIC | Facility: OTHER | Age: 23
End: 2023-06-05
Payer: COMMERCIAL

## 2023-06-05 VITALS
WEIGHT: 160.4 LBS | BODY MASS INDEX: 21.26 KG/M2 | TEMPERATURE: 98 F | OXYGEN SATURATION: 98 % | DIASTOLIC BLOOD PRESSURE: 80 MMHG | RESPIRATION RATE: 18 BRPM | SYSTOLIC BLOOD PRESSURE: 124 MMHG | HEIGHT: 73 IN | HEART RATE: 93 BPM

## 2023-06-05 DIAGNOSIS — Z11.59 NEED FOR HEPATITIS C SCREENING TEST: ICD-10-CM

## 2023-06-05 DIAGNOSIS — Z00.00 ANNUAL PHYSICAL EXAM: Primary | ICD-10-CM

## 2023-06-05 DIAGNOSIS — F41.1 GENERALIZED ANXIETY DISORDER: ICD-10-CM

## 2023-06-05 DIAGNOSIS — Z72.51 HIGH RISK HETEROSEXUAL BEHAVIOR: ICD-10-CM

## 2023-06-05 DIAGNOSIS — Z11.4 SCREENING FOR HIV (HUMAN IMMUNODEFICIENCY VIRUS): ICD-10-CM

## 2023-06-05 DIAGNOSIS — F40.10 SOCIAL ANXIETY DISORDER: ICD-10-CM

## 2023-06-05 DIAGNOSIS — R55 PRE-SYNCOPE: ICD-10-CM

## 2023-06-05 PROCEDURE — 99395 PREV VISIT EST AGE 18-39: CPT | Performed by: FAMILY MEDICINE

## 2023-06-05 NOTE — PROGRESS NOTES
[facility] [address]    NAME: Medina Reed  AGE: 25 y o  SEX: male     : 2000     DATE: 2023     Assessment and Plan:     Problem List Items Addressed This Visit        Other    Social anxiety disorder    Relevant Orders    CBC and differential    Comprehensive metabolic panel    Lipid panel    Vitamin D 25 hydroxy    Generalized anxiety disorder    Relevant Orders    CBC and differential    Comprehensive metabolic panel    Lipid panel    Vitamin D 25 hydroxy   Other Visit Diagnoses     Annual physical exam    -  Primary    Relevant Orders    CBC and differential    Comprehensive metabolic panel    Lipid panel    Vitamin D 25 hydroxy    Need for hepatitis C screening test        Relevant Orders    Hepatitis C antibody    Screening for HIV (human immunodeficiency virus)        Relevant Orders    : HIV 1/2 AB/AG w Reflex [facility] for 2 yr old and above    BMI 21 0-21 9, adult        High risk heterosexual behavior        Relevant Orders    : HIV 1/2 AB/AG w Reflex [facility] for 2 yr old and above    Chlamydia/GC amplified DNA by PCR    RPR-Syphilis Screening (Total Syphilis IGG/IGM)    Pre-syncope            Of note, patient had pre-syncopal episode in waiting room just following the appointment while he was checking out  He had pro-dromal symptoms of feeling flushed, lightheaded, and then felt like he may pass out so he sat down  On exam following the event he was pale and diaphoretic  His BP was 98 systolic, down from appt BP of 217 systolic  He reports he was feeling nervous/anxious about coming to the doctor  He rested for 15 minutes and was visually monitored for this time period  He was given water and freeze-pops and his appearance returned to normal  Patient felt well  We suspect this was a vaso-vagal response, and he was deemed appropriate to drive home after adequate rest and hydration   I encouraged him to continue hydrating at home        Immunizations and preventive care screenings were discussed with patient today  Appropriate education was printed on patient's after visit summary  Counseling:  Alcohol/drug use: discussed moderation in alcohol intake, the recommendations for healthy alcohol use, and avoidance of illicit drug use  Dental Health: discussed importance of regular tooth brushing, flossing, and dental visits  Sexual health: discussed sexually transmitted diseases, partner selection, use of condoms, avoidance of unintended pregnancy, and contraceptive alternatives  · Exercise: the importance of regular exercise/physical activity was discussed  Recommend exercise 3-5 times per week for at least 30 minutes  Depression Screening and Follow-up Plan: Patient was screened for depression during today's encounter  They screened negative with a PHQ-2 score of 0  Return in about 3 months (around 9/5/2023) for Recheck SUE, virtual visit  Chief Complaint:     Chief Complaint   Patient presents with   • Physical Exam      History of Present Illness:     Adult Annual Physical   Patient here for a comprehensive physical exam  The patient reports no problems  Increased zoloft to 50mg daily about a month ago  He had some initial GI side effect with the increase dose for about a week but tat resolved  He overall has not had any panic attack in a few months  Diet and Physical Activity   · Diet/Nutrition: well balanced diet  · Exercise: Gymm 3x/week and hikings and does disc golfing with friends         Depression Screening  PHQ-2/9 Depression Screening    Little interest or pleasure in doing things: 0 - not at all  Feeling down, depressed, or hopeless: 0 - not at all  PHQ-2 Score: 0  PHQ-2 Interpretation: Negative depression screen       General Health  · Sleep: sleeps well and 7-8 hours on average  · Hearing: normal - bilateral   · Vision: no vision problems  · Dental: regular dental visits          Health  · History of STDs?: no   · Currently sexually active with one female partner  Uses condoms every time  Drinks alcohol 1-2x per weeks, 4-5 drinks per occasion  Saul tobacco or marijuana use , Denies illicit drug use  Review of Systems:     Review of Systems   Constitutional: Negative for activity change, appetite change, chills, diaphoresis, fatigue, fever and unexpected weight change  HENT: Negative for congestion, rhinorrhea, sore throat and trouble swallowing  Eyes: Negative for visual disturbance  Respiratory: Negative for cough, choking and shortness of breath  Cardiovascular: Negative for chest pain, palpitations and leg swelling  Gastrointestinal: Negative for abdominal pain, constipation, diarrhea, nausea and vomiting  Genitourinary: Negative for difficulty urinating, dysuria and frequency  Musculoskeletal: Negative for arthralgias, joint swelling and myalgias  Skin: Negative for color change and rash  Allergic/Immunologic: Negative for environmental allergies and food allergies  Neurological: Negative for dizziness, light-headedness and headaches  Psychiatric/Behavioral: Negative for dysphoric mood  The patient is nervous/anxious              Past Medical History:     Past Medical History:   Diagnosis Date   • Asthma    • Exercise-induced asthma       Past Surgical History:     Past Surgical History:   Procedure Laterality Date   • WISDOM TOOTH EXTRACTION  2016      Social History:     Social History     Socioeconomic History   • Marital status: Single     Spouse name: None   • Number of children: None   • Years of education: None   • Highest education level: None   Occupational History   • Occupation:      Comment: [employer]   • Occupation: student     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[address], [employer] marketing and supply chain managment   Tobacco Use   • Smoking status: Never   • Smokeless tobacco: Never   Vaping Use   • Vaping Use: Never used   Substance "and Sexual Activity   • Alcohol use: Yes     Alcohol/week: 4 0 standard drinks of alcohol     Types: 4 Standard drinks or equivalent per week     Comment: social   • Drug use: Never   • Sexual activity: Yes     Partners: Female     Birth control/protection: Condom Male   Other Topics Concern   • None   Social History Narrative   • None     Social Determinants of Health     Financial Resource Strain: Not on file   Food Insecurity: Not on file   Transportation Needs: Not on file   Physical Activity: Not on file   Stress: Not on file   Social Connections: Not on file   Intimate Partner Violence: Not on file   Housing Stability: Not on file      Family History:     Family History   Problem Relation Age of Onset   • Mental illness Mother    • Anxiety disorder Mother    • Mental illness Sister       Current Medications:     Current Outpatient Medications   Medication Sig Dispense Refill   • loratadine (CLARITIN) 10 mg tablet Take 10 mg by mouth daily     • LORazepam (Ativan) 0 5 mg tablet Take 1 tablet (0 5 mg total) by mouth daily as needed for anxiety 10 tablet 0   • sertraline (Zoloft) 50 mg tablet Take 1 tablet (50 mg total) by mouth daily 90 tablet 0     No current facility-administered medications for this visit  Allergies:     No Known Allergies   Physical Exam:     /80   Pulse 93   Temp 98 °F (36 7 °C)   Resp 18   Ht 6' 1\" (1 854 m)   Wt 72 8 kg (160 lb 6 4 oz)   SpO2 98%   BMI 21 16 kg/m²     Physical Exam  Vitals reviewed  Constitutional:       General: He is not in acute distress  Appearance: Normal appearance  He is normal weight  He is not ill-appearing  HENT:      Head: Normocephalic and atraumatic  Right Ear: Tympanic membrane, ear canal and external ear normal       Left Ear: Tympanic membrane, ear canal and external ear normal       Nose: Nose normal  No congestion  Mouth/Throat:      Mouth: Mucous membranes are moist       Pharynx: Oropharynx is clear     Eyes:      " Extraocular Movements: Extraocular movements intact  Conjunctiva/sclera: Conjunctivae normal       Pupils: Pupils are equal, round, and reactive to light  Cardiovascular:      Rate and Rhythm: Normal rate and regular rhythm  Pulses: Normal pulses  Heart sounds: Normal heart sounds  Pulmonary:      Effort: Pulmonary effort is normal       Breath sounds: Normal breath sounds  Abdominal:      General: Abdomen is flat  Bowel sounds are normal       Palpations: Abdomen is soft  Musculoskeletal:      Cervical back: Normal range of motion and neck supple  Right lower leg: No edema  Left lower leg: No edema  Skin:     General: Skin is warm and dry  Neurological:      General: No focal deficit present  Mental Status: He is alert and oriented to person, place, and time  Cranial Nerves: No cranial nerve deficit  Sensory: No sensory deficit  Motor: No weakness  Gait: Gait normal    Psychiatric:         Mood and Affect: Mood normal          Behavior: Behavior normal          Thought Content:  Thought content normal           Ramon Umanzor DO   [facility] [address] [facility] [address]

## 2023-06-14 DIAGNOSIS — F40.10 SOCIAL ANXIETY DISORDER: ICD-10-CM

## 2023-06-14 DIAGNOSIS — F41.1 GENERALIZED ANXIETY DISORDER: ICD-10-CM

## 2023-08-15 ENCOUNTER — TELEMEDICINE (OUTPATIENT)
Dept: BEHAVIORAL/MENTAL HEALTH CLINIC | Facility: CLINIC | Age: 23
End: 2023-08-15
Payer: COMMERCIAL

## 2023-08-15 DIAGNOSIS — F41.1 GENERALIZED ANXIETY DISORDER: Primary | ICD-10-CM

## 2023-08-15 PROCEDURE — 90834 PSYTX W PT 45 MINUTES: CPT | Performed by: SOCIAL WORKER

## 2023-08-16 NOTE — PSYCH
Virtual Regular Visit    Verification of patient location:    Patient is located at Home in the following state in which I hold an active license [place]      Assessment/Plan:    Problem List Items Addressed This Visit    None      Goals addressed in session: Goal 1          Reason for visit is   Chief Complaint   Patient presents with   • Virtual Regular Visit        Encounter provider  [place]    Provider located at [address] [facility]  [address] [place] [address] [phone]      Recent Visits  Date Type Provider Dept   08/15/23 Telemedicine [address] [facility] recent visits within past 7 days and meeting all other requirements  Future Appointments  No visits were found meeting these conditions. Showing future appointments within next 150 days and meeting all other requirements       The patient was identified by name and date of birth. Lyubov Mcgrath was informed that this is a telemedicine visit and that the visit is being conducted throughthe [employer] platform. He agrees to proceed. .  My office door was closed. No one else was in the room. He acknowledged consent and understanding of privacy and security of the video platform. The patient has agreed to participate and understands they can discontinue the visit at any time. Patient is aware this is a billable service. Subjective  Lyubov Mcgrath is a 25 y.o. male presenting to this writer with ongoing anxiety. Pt presents with follow up after almost a year of not being seen. Pt reports he has been struggling. Anxiety has been increasing considerably in certain areas and times of pt's life.  Pt has noticed whenever big change is upcoming anxiety ramps up considerably- for example prior to going to college, prior to classes starting, starting a new internship etc. Pt reports it was becoming difficult to manage and so pt decided to meet with his PCP and Zoloft has been started. Pt has noticed an improvement since then. Pt reports he is still having anxiety at different times but it is more manageable. Pt and writer discussed ways to manage anxiety including self care. Pt and this writer discussed how when anxiety is at 3 out of 10, pt can manage it by doing grounding techniques, exercise, breathing techniques. When anxiety goes up to a 5 out of 10, it triggers more of a fight / flight response, where pt is out of control and is more panicked. Pt reports with the Zoloft this has not been happening as much (maybe twice in the last 6 months). Pt and writer discussed how the fight flight response is starting up because pt feels he is under threat and in danger. Pt and writer discussed activating the parasympathetic nervous system, and pt is encouraged to read article provided. Pt is encouraged to start to build these activities mentioned in the article into his everyday life. This will help keep anxiety at [place], and get his body used to calm. Session was finished off by pt and writer discussed common thought traps, and whether of not pt can adequately defend some of his thoughts- Pt found this helpful, and he is going to start to see if he can defend some of his worst fears in the light of many occasions where his worst fears were not realized. Pt does a lot of filtering thought traps, and this was discussed. Pt is encouraged to not only focus on the one event which went wrong, but on the 9 which went great. Pt and writer also discussed the role imagination plays in blowing up these negative thoughts. Pt is encouraged to start mindfulness meditation as part of his self care regimen, to increase the calm processes in his life. Follow up in 4 weeks time.    HPI     Past Medical History:   Diagnosis Date   • Asthma    • Exercise-induced asthma        Past Surgical History:   Procedure Laterality Date   • WISDOM TOOTH EXTRACTION  2016 Current Outpatient Medications   Medication Sig Dispense Refill   • loratadine (CLARITIN) 10 mg tablet Take 10 mg by mouth daily     • LORazepam (Ativan) 0.5 mg tablet Take 1 tablet (0.5 mg total) by mouth daily as needed for anxiety 10 tablet 0   • sertraline (ZOLOFT) 50 mg tablet TAKE 1 TABLET BY MOUTH EVERY DAY 90 tablet 1     No current facility-administered medications for this visit. No Known Allergies    Review of Systems    Video Exam    There were no vitals filed for this visit.     Physical Exam     Visit Time    Visit Start Time: 0452  Visit Stop Time: 7486   Total Visit Duration: 45 minutes

## 2023-09-08 ENCOUNTER — TELEMEDICINE (OUTPATIENT)
Dept: FAMILY MEDICINE CLINIC | Facility: OTHER | Age: 23
End: 2023-09-08
Payer: COMMERCIAL

## 2023-09-08 DIAGNOSIS — F40.10 SOCIAL ANXIETY DISORDER: Primary | ICD-10-CM

## 2023-09-08 DIAGNOSIS — F41.1 GENERALIZED ANXIETY DISORDER: ICD-10-CM

## 2023-09-08 PROCEDURE — 99213 OFFICE O/P EST LOW 20 MIN: CPT

## 2023-09-08 NOTE — PROGRESS NOTES
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 Feeling nervous, anxious, or on edge 1   Not being able to stop or control worrying 1   Worrying too much about different things 1   Trouble relaxing 0   Being so restless that it is hard to sit still 0   Becoming easily annoyed or irritable 0   Feeling afraid as if something awful might happen 1   SUE-7 Total Score 4        SUE-7 of 4, improved from SUE-7 of 5 at previous visit    Plan:  · Continue with Zoloft 50 mg once daily  · Continue with CBT and mindfulness exercises  · Follow-up in 6 months, or sooner if needed for problem                 Reason for visit is No chief complaint on file. Encounter provider Gutierrez Eduardo MD    Provider located at [address] [place] [id number]      Recent Visits  No visits were found meeting these conditions.   Showing recent visits within past 7 days and meeting all other requirements  Today's Visits  Date Type Provider Dept   09/08/23 Telemedicine MD Honorio Simpson   Showing today's visits and meeting all other requirements  Future Appointments  No visits were found meeting these conditions. Showing future appointments within next 150 days and meeting all other requirements       The patient was identified by name and date of birth. Gali Hawley was informed that this is a telemedicine visit and that the visit is being conducted through the [email or web address]. He agrees to proceed. .  My office door was closed. No one else was in the room. He acknowledged consent and understanding of privacy and security of the video platform. The patient has agreed to participate and understands they can discontinue the visit at any time. Patient is aware this is a billable service. Sammy Hawley is a 25 y.o. male assessed via telemedicine for follow-up of anxiety. Sammy Hawley is a 25 y.o. male who presents for follow up of anxiety disorder. Current symptoms: none. He denies current suicidal and homicidal ideation. He complains of the following side effects from the treatment: none. Objective  There were no vitals taken for this visit. General:  alert and oriented, in no acute distress  Affect/Behavior:  full facial expressions, good grooming, good insight, normal perception, normal reasoning, normal speech pattern and content and normal thought patterns     Assessment/Plan  Anxiety Disorder - improving    Medications: Lexapro. Instructed patient to contact office or on-call physician promptly should condition worsen or any new symptoms appear and provided on-call telephone numbers. IF THE PATIENT HAS ANY SUICIDAL OR HOMICIDAL IDEATIONS, CALL THE OFFICE, DISCUSS WITH A SUPPORT MEMBER, OR GO TO THE ER IMMEDIATELY. Patient was agreeable with this plan. Follow up: 6 months. .         Past Medical History:   Diagnosis Date   • Asthma    • Exercise-induced asthma        Past Surgical History:   Procedure Laterality Date   • WISDOM TOOTH EXTRACTION  2016       Current Outpatient Medications   Medication Sig Dispense Refill   • loratadine (CLARITIN) 10 mg tablet Take 10 mg by mouth daily     • LORazepam (Ativan) 0.5 mg tablet Take 1 tablet (0.5 mg total) by mouth daily as needed for anxiety 10 tablet 0   • sertraline (ZOLOFT) 50 mg tablet TAKE 1 TABLET BY MOUTH EVERY DAY 90 tablet 1     No current facility-administered medications for this visit. No Known Allergies    Review of Systems   Constitutional: Negative for activity change. HENT: Negative for dental problem and sore throat. Eyes: Negative for visual disturbance. Respiratory: Negative for chest tightness. Cardiovascular: Negative for chest pain. Gastrointestinal: Negative for abdominal pain. Genitourinary: Negative for dysuria. Musculoskeletal: Negative for back pain. Skin: Negative for rash. Psychiatric/Behavioral: Negative for agitation. Video Exam    There were no vitals filed for this visit. Physical Exam  Constitutional:       General: He is not in acute distress. Appearance: Normal appearance. HENT:      Head: Normocephalic and atraumatic. Right Ear: External ear normal.      Left Ear: External ear normal.   Eyes:      Extraocular Movements: Extraocular movements intact. Pulmonary:      Effort: Pulmonary effort is normal.   Skin:     Coloration: Skin is not jaundiced or pale. Neurological:      Mental Status: He is alert. Mental status is at baseline. Psychiatric:         Mood and Affect: Mood normal.         Behavior: Behavior normal.         Thought Content:  Thought content normal.         Judgment: Judgment normal.          Visit Time  Total Visit Duration: 20 minutes

## 2023-09-08 NOTE — ASSESSMENT & PLAN NOTE
SUE-7 Flowsheet Screening    Flowsheet Row Most Recent Value   Over the last 2 weeks, how often have you been bothered by any of the following problems?     Feeling nervous, anxious, or on edge 1   Not being able to stop or control worrying 1   Worrying too much about different things 1   Trouble relaxing 0   Being so restless that it is hard to sit still 0   Becoming easily annoyed or irritable 0   Feeling afraid as if something awful might happen 1   SUE-7 Total Score 4        SUE-7 of 4, improved from SUE-7 of 5 at previous visit    Plan:  · Continue with Zoloft 50 mg once daily  · Continue with CBT and mindfulness exercises  · Follow-up in 6 months, or sooner if needed for problem

## 2023-09-08 NOTE — ASSESSMENT & PLAN NOTE
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

## 2023-10-03 ENCOUNTER — TELEMEDICINE (OUTPATIENT)
Dept: BEHAVIORAL/MENTAL HEALTH CLINIC | Facility: CLINIC | Age: 23
End: 2023-10-03
Payer: COMMERCIAL

## 2023-10-03 DIAGNOSIS — F41.1 GENERALIZED ANXIETY DISORDER: Primary | ICD-10-CM

## 2023-10-03 PROCEDURE — 90834 PSYTX W PT 45 MINUTES: CPT | Performed by: SOCIAL WORKER

## 2023-10-03 NOTE — PSYCH
1. Generalized anxiety disorder          Data: Pt and writer discussed ongoing generalized anxiety. Pt reports he is doing better on medicine. Pt reports following up on prosecuting the irrational negative thoughts he is experiencing, and he has had good success with this. Pt reports recent presentation where pt was able to identify evidence which shows he will make a success of the presentation. Pt was then david to dismiss negative self talk and improve his though process regarding the event. Pt and this writer also discussed PMR and getting into a relaxed body combined with deep breathing. Pt and writer discussed how this grounding technique can activate parasympathetic dominance and shut down the fight flight response. Pt is encouraged to focus on doing these exercises whenever anxiety starts to elevate and increase. Pt is encouraged to continue to read up on parasympathetic dominance and how to activate the calm system through interventions in self care, exercise, thoughts, mindfulness etc.     Pt's goal for next session is to work towards another presentation but with healthy thinking combined with relaxed body and deep breathing. Pt is encouraged to see how much success he has with this combination. Plan: Follow up in 5 weeks time. Psychotherapy Provided: Individual Psychotherapy 45 minutes     Length of time in session: 40 minutes, follow up in 5 week    Goals addressed in session: Goal 1     Pain:      none    0    Current suicide risk : Low     Pt is future oriented and not suicidal.       Behavioral Health Treatment Plan [facility]: Diagnosis and Treatment Plan explained to Bibi Covington relates understanding diagnosis and is agreeable to Treatment Plan.  Yes     Visit start and stop times:    10/03/23  Start Time: 1405  Stop Time: 1445  Total Visit Time: 40 minutes

## 2023-12-12 ENCOUNTER — SOCIAL WORK (OUTPATIENT)
Dept: BEHAVIORAL/MENTAL HEALTH CLINIC | Facility: CLINIC | Age: 23
End: 2023-12-12
Payer: COMMERCIAL

## 2023-12-12 DIAGNOSIS — F41.1 GENERALIZED ANXIETY DISORDER: Primary | ICD-10-CM

## 2023-12-12 PROCEDURE — 90832 PSYTX W PT 30 MINUTES: CPT | Performed by: SOCIAL WORKER

## 2023-12-12 NOTE — PSYCH
Start Time: 1610  Stop Time: 1640  Total Visit Time: 30 minutes      Virtual Regular Visit    Verification of patient location:    Patient is located at Home in the following state in which I hold an active license [place]      Assessment/Plan:    Problem List Items Addressed This Visit        Other    Generalized anxiety disorder - Primary       Goals addressed in session: Goal 1          Reason for visit is   Chief Complaint   Patient presents with   • Virtual Regular Visit          Encounter provider Harris Frye    Provider located at [place] [address] [address] [address] [phone]      Recent Visits  No visits were found meeting these conditions.  Showing recent visits within past 7 days and meeting all other requirements  Future Appointments  No visits were found meeting these conditions.  Showing future appointments within next 150 days and meeting all other requirements       The patient was identified by name and date of birth. Bjorn Wallace was informed that this is a telemedicine visit and that the visit is being conducted throughthe [employer] platform. He agrees to proceed..  My office door was closed. No one else was in the room.  He acknowledged consent and understanding of privacy and security of the video platform. The patient has agreed to participate and understands they can discontinue the visit at any time.    Patient is aware this is a billable service.     Subjective  Bjorn Wallace is a 23 y.o. male presenting to this writer with ongoing anxiety, general anxiety and anxiety episodes. Pt reports he is working on the macro picture, which is finding solutions for anxiety in his environment (traveling by himself to events, parties and work to increase control), working to exercise more, regulate his sleep, and stay on top of his time table by reducing stress. Pt is also practicing the micro response to  anxiety- PMR, breathing techniques, grounding techniques. Pt has been de-escalating situaitons in the early stages of anxiety. Pt reports he is doing this by zooming out, and working on putting things into perspective.     This session focused on identifying thought traps, pt is falling into. Pt and writer discussed the concept of thought traps, and unhelpful thinking patterns which cause anxiety.Pt reports he has been facing a lot of these negative thoughts in relation to applying for jobs- psychic and catastrophic thinking, personalizing rejection, and filtering the good things out, and meditating on the rejection or the things which are not going well. Pt is encouraged to read thought trap article sent so he can identify more thoughts traps he might be guilty of. Next session will focus on prosecuting the negative thought traps.      HPI     Past Medical History:   Diagnosis Date   • Asthma    • Exercise-induced asthma        Past Surgical History:   Procedure Laterality Date   • WISDOM TOOTH EXTRACTION  2016       Current Outpatient Medications   Medication Sig Dispense Refill   • loratadine (CLARITIN) 10 mg tablet Take 10 mg by mouth daily     • LORazepam (Ativan) 0.5 mg tablet Take 1 tablet (0.5 mg total) by mouth daily as needed for anxiety 10 tablet 0   • sertraline (ZOLOFT) 50 mg tablet TAKE 1 TABLET BY MOUTH EVERY DAY 90 tablet 1     No current facility-administered medications for this visit.        No Known Allergies    Review of Systems    Video Exam    There were no vitals filed for this visit.    Physical Exam     Visit Time    Visit Start Time: 1610  Visit Stop Time: 1640  Total Visit Duration:  30 minutes

## 2024-01-04 DIAGNOSIS — F40.10 SOCIAL ANXIETY DISORDER: ICD-10-CM

## 2024-01-04 DIAGNOSIS — F41.1 GENERALIZED ANXIETY DISORDER: ICD-10-CM

## 2024-01-09 ENCOUNTER — TELEPHONE (OUTPATIENT)
Age: 24
End: 2024-01-09

## 2024-01-23 ENCOUNTER — SOCIAL WORK (OUTPATIENT)
Dept: BEHAVIORAL/MENTAL HEALTH CLINIC | Facility: CLINIC | Age: 24
End: 2024-01-23
Payer: COMMERCIAL

## 2024-01-23 DIAGNOSIS — F41.1 GENERALIZED ANXIETY DISORDER: Primary | ICD-10-CM

## 2024-01-23 PROCEDURE — 90834 PSYTX W PT 45 MINUTES: CPT | Performed by: SOCIAL WORKER

## 2024-01-23 NOTE — PSYCH
1. Generalized anxiety disorder          Data: Pt and writer discussed ongoing generalized anxiety. Pt reports he is doing much better. Pt reports he is managing symptoms much better. Pt reports he is stopping the escalation of anxiety in its tracks before it becomes a major problem.     Pt and writer spent this session reviewing literature in past sessions. Pt and writer also used the concept of defense and offense to help give pt some tools to manage anxiety. Pt and this writer came up with some examples of defensive tools including improved thought processes, reducing anxiety by focusing on what pt is achieving and not his deficiencies. Pt and writer also discussed offensive strategies including what pt can do to reduce anxiety. Pt identifies exercise, spending time with his friends, doing self care activities.     Pt and writer also discussed narratives over his life- pt puts himself under a lot of pressure. Much anxiety is focused on what pt will do for work. Pt also tends to identify his deficits rather than his gifts. This writer suggests pt use mindfulness tools to take each day as it comes, and to live in the moment and  not get caught up with the disappointments with life, and to accept the ebb and flow of life. Pt is encouraged to read up on mindfulness prior to next appointment.     Plan: Follow up in 2 months times.       Psychotherapy Provided: Individual Psychotherapy 45 minutes     Length of time in session: 45 minutes, follow up in 2 month    Goals addressed in session: Goal 1     Pain:      none    0    Current suicide risk : Low     Pt is future oriented and not suicidal.       Behavioral Health Treatment Plan [facility]: Diagnosis and Treatment Plan explained to Bjorn, Bjorn relates understanding diagnosis and is agreeable to Treatment Plan. Yes     Visit start and stop times:    01/23/24  Start Time: 1500  Stop Time: 1545  Total Visit Time: 45 minutes

## 2024-01-30 DIAGNOSIS — F40.10 SOCIAL ANXIETY DISORDER: ICD-10-CM

## 2024-01-30 DIAGNOSIS — F41.1 GENERALIZED ANXIETY DISORDER: ICD-10-CM

## 2024-04-09 ENCOUNTER — SOCIAL WORK (OUTPATIENT)
Dept: BEHAVIORAL/MENTAL HEALTH CLINIC | Facility: CLINIC | Age: 24
End: 2024-04-09
Payer: COMMERCIAL

## 2024-04-09 DIAGNOSIS — F41.1 GENERALIZED ANXIETY DISORDER: Primary | ICD-10-CM

## 2024-04-09 PROCEDURE — 90834 PSYTX W PT 45 MINUTES: CPT | Performed by: SOCIAL WORKER

## 2024-04-10 ENCOUNTER — DOCUMENTATION (OUTPATIENT)
Dept: BEHAVIORAL/MENTAL HEALTH CLINIC | Facility: CLINIC | Age: 24
End: 2024-04-10

## 2024-04-10 NOTE — PROGRESS NOTES
Psychotherapy Discharge Summary    Preferred Name: Bjorn Wallace  YOB: 2000    Admission date to psychotherapy: 5-3-22    Referred by: Dr. Crawford    Presenting Problem: SUE    Course of treatment included : psychoeducation and individual therapy     Progress/Outcome of Treatment Goals (brief summary of course of treatment) Pt and writer worked on solution focused brief therapy interventions to provide psychoeducation and CBT work to improve thoughts and responses to triggers of anxiety.     Treatment Complications (if any): none    Treatment Progress: good    Current [facility] Psychiatric Provider: NA    Discharge Medications include: NA    Discharge Date: 4-9-2024    Discharge Diagnosis: SUE    Criteria for Discharge: completed treatment goals and objectives and is no longer in need of services    Aftercare recommendations include (include specific referral names and phone numbers, if appropriate): NA    Prognosis: good

## 2024-04-10 NOTE — PSYCH
Virtual Regular Visit    Verification of patient location:    Patient is located at Home in the following state in which I hold an active license [place]      Assessment/Plan:    Problem List Items Addressed This Visit        Behavioral Health    Generalized anxiety disorder - Primary       Goals addressed in session: Goal 1 and Goal 2          Reason for visit is   Chief Complaint   Patient presents with   • Virtual Regular Visit          Encounter provider Harris Fyre    Provider located at [place] [address] [address] [address] [phone]      Recent Visits  No visits were found meeting these conditions.  Showing recent visits within past 7 days and meeting all other requirements  Future Appointments  No visits were found meeting these conditions.  Showing future appointments within next 150 days and meeting all other requirements       The patient was identified by name and date of birth. Bjorn Wallace was informed that this is a telemedicine visit and that the visit is being conducted throughthe [employer] platform. He agrees to proceed..  My office door was closed. No one else was in the room.  He acknowledged consent and understanding of privacy and security of the video platform. The patient has agreed to participate and understands they can discontinue the visit at any time.    Patient is aware this is a billable service.     Subjective  Bjorn Wallace is a 23 y.o. male presenting to this writer with ongoing generalized anxiety. Pt reports he is doing much better with generalized anxiety. Pt states he is still experiencing bouts of anxiety, but no panic attacks, and anxiety is staying at a 2 out of 10 with some variations upwards but nothing more than 4 out of 10 at any time over the last two months. Pt credits grounding techniques and improvement in self care (sleep, exercise, waking up earlier to get things  organized). Pt and this writer also discussed the value pt is finding in going outside when he feels anxious, or taking a walk during break. Pt reports this is helping him gain a better perspective in life.     This session focused on two main areas related to staying in a parasympathetic system. Firstly pt is encouraged to work to keep himself in a relaxed body as much as possible. Secondly, pt is encouraged to work to widen his gaze to include peripheral vision as this is connected to transitioning into a calmer system. Pt is also encouraged to slow his movement down in the morning by getting up earlier. This will allow him to get into a mindfulness routine of enjoying the journey and not racing to get to the destination.     Pt and writer discussed his acceptance of job offer and how this has also reduced anxious thoughts related to his future. Pt feels good about the future, feels ready for dc.         HPI     Past Medical History:   Diagnosis Date   • Asthma    • Exercise-induced asthma        Past Surgical History:   Procedure Laterality Date   • WISDOM TOOTH EXTRACTION  2016       Current Outpatient Medications   Medication Sig Dispense Refill   • loratadine (CLARITIN) 10 mg tablet Take 10 mg by mouth daily     • LORazepam (Ativan) 0.5 mg tablet Take 1 tablet (0.5 mg total) by mouth daily as needed for anxiety 10 tablet 0   • sertraline (ZOLOFT) 50 mg tablet Take 1 tablet (50 mg total) by mouth daily 90 tablet 1     No current facility-administered medications for this visit.        No Known Allergies    Review of Systems    Video Exam    There were no vitals filed for this visit.    Physical Exam     Visit Time  4-9-2024  Visit Start Time: 1505  Visit Stop Time: 1550  Total Visit Duration:  45 minutes

## 2024-04-11 ENCOUNTER — TELEPHONE (OUTPATIENT)
Dept: BEHAVIORAL/MENTAL HEALTH CLINIC | Facility: CLINIC | Age: 24
End: 2024-04-11

## 2024-04-11 NOTE — TELEPHONE ENCOUNTER
Writer LVM for call back to get a copy of front and back of insurance card to add claim address ans add to chart

## 2024-07-22 DIAGNOSIS — F41.1 GENERALIZED ANXIETY DISORDER: ICD-10-CM

## 2024-07-22 DIAGNOSIS — F40.10 SOCIAL ANXIETY DISORDER: ICD-10-CM

## 2024-12-18 ENCOUNTER — TELEPHONE (OUTPATIENT)
Age: 24
End: 2024-12-18

## 2025-06-23 DIAGNOSIS — F40.10 SOCIAL ANXIETY DISORDER: ICD-10-CM

## 2025-06-23 DIAGNOSIS — F41.1 GENERALIZED ANXIETY DISORDER: ICD-10-CM

## 2025-06-23 NOTE — TELEPHONE ENCOUNTER
Reason for call:   [x] Refill   [] Prior Auth  [] Other:     Office:   [x] PCP/Provider -   [] Specialty/Provider -     Medication: sertraline (ZOLOFT) 50 mg tablet- TAKE 1 TABLET BY MOUTH EVERY DAY        Pharmacy: [place], [place]    Local Pharmacy   Does the patient have enough for 3 days?   [] Yes   [x] No - Send as HP to POD    Mail Away Pharmacy   Does the patient have enough for 10 days?   [] Yes   [] No - Send as HP to POD

## 2025-07-16 DIAGNOSIS — F41.1 GENERALIZED ANXIETY DISORDER: ICD-10-CM

## 2025-07-16 DIAGNOSIS — F40.10 SOCIAL ANXIETY DISORDER: ICD-10-CM
